# Patient Record
Sex: MALE | Race: BLACK OR AFRICAN AMERICAN | NOT HISPANIC OR LATINO | Employment: OTHER | URBAN - METROPOLITAN AREA
[De-identification: names, ages, dates, MRNs, and addresses within clinical notes are randomized per-mention and may not be internally consistent; named-entity substitution may affect disease eponyms.]

---

## 2022-10-24 ENCOUNTER — OFFICE VISIT (OUTPATIENT)
Dept: NEPHROLOGY | Facility: CLINIC | Age: 72
End: 2022-10-24
Payer: OTHER GOVERNMENT

## 2022-10-24 VITALS
DIASTOLIC BLOOD PRESSURE: 72 MMHG | WEIGHT: 272 LBS | BODY MASS INDEX: 33.12 KG/M2 | SYSTOLIC BLOOD PRESSURE: 138 MMHG | HEIGHT: 76 IN | OXYGEN SATURATION: 97 % | HEART RATE: 76 BPM

## 2022-10-24 DIAGNOSIS — N18.32 STAGE 3B CHRONIC KIDNEY DISEASE: Primary | ICD-10-CM

## 2022-10-24 PROCEDURE — 99213 PR OFFICE/OUTPT VISIT, EST, LEVL III, 20-29 MIN: ICD-10-PCS | Mod: S$GLB,,, | Performed by: INTERNAL MEDICINE

## 2022-10-24 PROCEDURE — 99213 OFFICE O/P EST LOW 20 MIN: CPT | Mod: S$GLB,,, | Performed by: INTERNAL MEDICINE

## 2022-10-24 NOTE — PROGRESS NOTES
Pt Name:  Jhonathan Carrillo  Pt :  1950  Pt MRN:  [id number]    Date: 10/24/2022  Provider: Abigail Ballesteros    Reason for visit: seeing for ckd.      Chief Complaint:   Chief Complaint   Patient presents with    Chronic Kidney Disease     Stage 3 cr 2.17 GFR 33       HPI:  [unfilled]  No complaints.    History:   Past Medical History:   Diagnosis Date    Arthritis     CAD (coronary artery disease)     CHF (congestive heart failure)     CKD (chronic kidney disease)     HTN (hypertension)     Proteinuria     Stroke      Past Surgical History:   Procedure Laterality Date    APPENDECTOMY      BACK SURGERY      CARDIAC CATHETERIZATION      CATARACT EXTRACTION Bilateral     ROTATOR CUFF REPAIR Right      Family History   Problem Relation Age of Onset    Hypertension Mother     Heart attack Mother     Hypertension Father     Heart attack Father     Seizures Sister     Hypertension Sister     Hypertension Brother      Social History     Substance and Sexual Activity   Alcohol Use Not Currently     Social History     Substance and Sexual Activity   Drug Use Yes    Types: Marijuana     Social History     Substance and Sexual Activity   Sexual Activity Yes    Partners: Female     reports being sexually active and has had partner(s) who are female.  Social History     Tobacco Use   Smoking Status Never   Smokeless Tobacco Never       Allergies:  Review of patient's allergies indicates:  No Known Allergies    [unfilled]    ROS:   Constitutional:  Denies fever or chills   Eyes:  Denies change in visual acuity   HENT:  Denies nasal congestion or sore throat   Respiratory:  As in the history of the present illness.   Cardiovascular:  As in the history of the present illness.   GI:  As in the history of the present illness.    Musculoskeletal:  Denies back pain or joint pain   Integument:  Denies rash   Neurologic:  Denies headache, focal weakness or sensory changes   Endocrine:  Denies polyuria or polydipsia   Lymphatic:  Denies swollen  "glands   Psychiatric:  Denies depression or anxiety    Physical Exam:   Vitals:   Vitals:    10/24/22 1319   BP: 138/72   Pulse: 76   SpO2: 97%   Weight: 123.4 kg (272 lb)   Height: 6' 4" (1.93 m)   PainSc: 0-No pain     Body mass index is 33.11 kg/m².    Constitutional:  Well developed, well nourished, and in no acute distress   Eyes:  PERRLA, conjunctiva normal   HENT:  Atraumatic, external ears normal, nose normal.  Neck: There is no jugular venous distension or thyromegaly.   Respiratory:  No respiratory distress, normal breath sounds, no rales, no wheezing   Cardiovascular:  Normal rate, and a regular rhythm, no murmurs, no gallops, no rub, and no edema.  GI:  Normal bowel sounds.  Musculoskeletal:  No deformities.   Neurologic:  Alert & oriented x 3, CN 2-12 normal, normal motor function, and no asterixis.   Psychiatric:  Speech and behavior appropriate.    Labs/Tests:  Lab Results   Component Value Date     10/21/2022    K 4.8 10/21/2022     (H) 10/21/2022    CO2 26 10/21/2022    BUN 27 (H) 10/21/2022    CREATININE 2.17 (H) 10/21/2022    CREATININE 2.08 (H) 03/09/2022    CREATININE 2.15 (H) 12/07/2021    GLUCOSE 102 (H) 10/21/2022    GLUCOSE Negative 10/21/2022    CALCIUM 10.2 10/21/2022    PHOSPHORUS 2.8 10/21/2022    ALBUMIN 4.6 10/21/2022    EGFRNONAA 29 (L) 10/21/2022    EGFRNONAA 31 (L) 03/09/2022    EGFRNONAA 29 (L) 12/07/2021    ESTGFRAFRICA 33 (L) 10/21/2022    ESTGFRAFRICA 35 (L) 03/09/2022    ESTGFRAFRICA 34 (L) 12/07/2021     (H) 10/21/2022    HGB 13.0 10/21/2022    HGB 12.9 03/09/2022    HGB 12.5 11/19/2021    TRIG 129 03/09/2022    CHOL 150 03/09/2022    HDL 34 (L) 03/09/2022    LDLCALC 90 03/09/2022    LABVLDL 26 03/09/2022       Assessment & Plan:    Visit Diagnosis:   [unfilled]  [unfilled]    Problem List: There is no problem list on file for this patient.    Ckd 3. - stable.    Get flu shot!    Follow Up:   Follow up in about 6 months (around " 4/24/2023).

## 2023-05-08 ENCOUNTER — OFFICE VISIT (OUTPATIENT)
Dept: NEPHROLOGY | Facility: CLINIC | Age: 73
End: 2023-05-08
Payer: OTHER GOVERNMENT

## 2023-05-08 VITALS
HEIGHT: 76 IN | BODY MASS INDEX: 32.49 KG/M2 | SYSTOLIC BLOOD PRESSURE: 140 MMHG | WEIGHT: 266.81 LBS | OXYGEN SATURATION: 97 % | HEART RATE: 69 BPM | DIASTOLIC BLOOD PRESSURE: 79 MMHG

## 2023-05-08 DIAGNOSIS — I10 PRIMARY HYPERTENSION: ICD-10-CM

## 2023-05-08 DIAGNOSIS — N18.4 STAGE 4 CHRONIC KIDNEY DISEASE: Primary | ICD-10-CM

## 2023-05-08 DIAGNOSIS — N25.81 SECONDARY HYPERPARATHYROIDISM OF RENAL ORIGIN: ICD-10-CM

## 2023-05-08 PROCEDURE — 99214 OFFICE O/P EST MOD 30 MIN: CPT | Mod: S$GLB,,, | Performed by: INTERNAL MEDICINE

## 2023-05-08 PROCEDURE — 99214 PR OFFICE/OUTPT VISIT, EST, LEVL IV, 30-39 MIN: ICD-10-PCS | Mod: S$GLB,,, | Performed by: INTERNAL MEDICINE

## 2023-05-08 RX ORDER — TIZANIDINE HYDROCHLORIDE 4 MG/1
4 CAPSULE, GELATIN COATED ORAL
COMMUNITY

## 2023-05-08 RX ORDER — ALBUTEROL SULFATE 90 UG/1
AEROSOL, METERED RESPIRATORY (INHALATION)
COMMUNITY
Start: 2022-05-17

## 2023-05-08 RX ORDER — SIMVASTATIN 80 MG/1
80 TABLET, FILM COATED ORAL
COMMUNITY
Start: 2023-03-14

## 2023-05-08 RX ORDER — FERROUS SULFATE 325(65) MG
325 TABLET ORAL
COMMUNITY
Start: 2022-08-29

## 2023-05-08 RX ORDER — EZETIMIBE 10 MG/1
10 TABLET ORAL DAILY
COMMUNITY

## 2023-05-08 RX ORDER — METOPROLOL SUCCINATE 100 MG/1
50 TABLET, EXTENDED RELEASE ORAL DAILY
COMMUNITY
Start: 2023-02-06

## 2023-05-08 NOTE — PROGRESS NOTES
Pt Name:  Jhonathan Carrillo  Pt :  1950  Pt MRN:  [id number]    Date: 2023  Provider: Abigail Ballesteros    Reason for visit: seeing for ckd.      Chief Complaint:   Chief Complaint   Patient presents with    Chronic Kidney Disease     Stage-4, Cr-2.57, GFR-25       HPI:  HPI    No complaints. No gi, gu, card or pulm complaints. Admits to taking some NSAIDs when he ran out of his muscle relaxants.   Pt counceled.        History:   Past Medical History:   Diagnosis Date    Arthritis     CAD (coronary artery disease)     CHF (congestive heart failure)     CKD (chronic kidney disease)     HTN (hypertension)     Proteinuria     Stroke      Past Surgical History:   Procedure Laterality Date    APPENDECTOMY      BACK SURGERY      CARDIAC CATHETERIZATION      CATARACT EXTRACTION Bilateral     ROTATOR CUFF REPAIR Right      Family History   Problem Relation Age of Onset    Hypertension Mother     Heart attack Mother     Hypertension Father     Heart attack Father     Seizures Sister     Hypertension Sister     Hypertension Brother      Social History     Substance and Sexual Activity   Alcohol Use Not Currently     Social History     Substance and Sexual Activity   Drug Use Yes    Types: Marijuana     Social History     Substance and Sexual Activity   Sexual Activity Yes    Partners: Female     reports being sexually active and has had partner(s) who are female.  Social History     Tobacco Use   Smoking Status Never   Smokeless Tobacco Never       Allergies:  Review of patient's allergies indicates:  No Known Allergies    Current Outpatient Medications   Medication Sig Dispense Refill    acetaminophen (TYLENOL) 500 MG tablet Take 500 mg by mouth every 6 (six) hours as needed.      albuterol (PROVENTIL/VENTOLIN HFA) 90 mcg/actuation inhaler INHALE 2 INHALATIONS BY ORAL INHALATION FOUR TIMES A DAY AS NEEDED FOR BREATHING      alprostadiL (EDEX) 10 mcg injection 10 mcg by Intracavitary route 3 (three) times a week. PRN       aspirin (ECOTRIN) 81 MG EC tablet Take 81 mg by mouth once daily.      buPROPion (WELLBUTRIN) 100 MG tablet Take 100 mg by mouth 2 (two) times daily.      calcium-vitamin D3 (OS-JOSE ANGEL 500 + D3) 500 mg-5 mcg (200 unit) per tablet Take 1 tablet by mouth 2 (two) times daily with meals.      capsicum 0.075% (ZOSTRIX) 0.075 % topical cream Apply topically 3 (three) times daily.      carvediloL (COREG) 12.5 MG tablet Take 12.5 mg by mouth 2 (two) times a day.      cloNIDine 0.3 mg/24 hr td ptwk (CATAPRES) 0.3 mg/24 hr Place 1 patch onto the skin once a week.      divalproex (DEPAKOTE) 250 MG EC tablet Take 250 mg by mouth 3 (three) times daily.      ezetimibe (ZETIA) 10 mg tablet Take 10 mg by mouth once daily.      ferrous sulfate (FEOSOL) 325 mg (65 mg iron) Tab tablet 325 mg.      furosemide (LASIX) 20 MG tablet Take 20 mg by mouth 2 (two) times daily.      HYDROcodone-acetaminophen (NORCO) 7.5-325 mg per tablet Take 1 tablet by mouth every 6 (six) hours as needed.      isosorbide dinitrate (ISORDIL) 10 MG tablet Take 10 mg by mouth 3 (three) times daily.      lisinopriL (PRINIVIL,ZESTRIL) 40 MG tablet Take 40 mg by mouth 2 (two) times daily.      methocarbamoL (ROBAXIN) 500 MG Tab Take 1,000 mg by mouth 4 (four) times daily as needed.      methyl salicylate-menthol 15-10% 15-10 % Crea Apply topically as needed.      metoprolol succinate (TOPROL-XL) 100 MG 24 hr tablet 50 mg.      omeprazole (PRILOSEC) 20 MG capsule Take 20 mg by mouth 2 (two) times daily.      polyvinyl alcohol, artificial tears, (LIQUIFILM TEARS) 1.4 % ophthalmic solution 1 drop as needed.      simvastatin (ZOCOR) 80 MG tablet 80 mg.      tiZANidine 4 mg Cap Take 4 mg by mouth every evening.      traMADoL (ULTRAM) 50 mg tablet Take 50 mg by mouth every 6 (six) hours as needed.      traZODone (DESYREL) 100 MG tablet Take 100 mg by mouth every evening. prn      hydrALAZINE (APRESOLINE) 10 MG tablet Take 20 mg by mouth 3 (three) times daily.       No  "current facility-administered medications for this visit.        ROS:   Constitutional:  Denies fever or chills   Eyes:  Denies change in visual acuity   HENT:  Denies nasal congestion or sore throat   Respiratory:  As in the history of the present illness.   Cardiovascular:  As in the history of the present illness.   GI:  As in the history of the present illness.    Musculoskeletal:  Denies back pain or joint pain   Integument:  Denies rash   Neurologic:  Denies headache, focal weakness or sensory changes   Endocrine:  Denies polyuria or polydipsia   Lymphatic:  Denies swollen glands   Psychiatric:  Denies depression or anxiety    Physical Exam:   Vitals:   Vitals:    05/08/23 1334   BP: (!) 140/79   Pulse: 69   SpO2: 97%   Weight: 121 kg (266 lb 12.8 oz)   Height: 6' 4" (1.93 m)   PainSc:   6   PainLoc: Back     Body mass index is 32.48 kg/m².    Constitutional:  Well developed, well nourished, and in no acute distress   Eyes:  PERRLA, conjunctiva normal   HENT:  Atraumatic, external ears normal, nose normal.  Neck: There is no jugular venous distension or thyromegaly.   Respiratory:  No respiratory distress, normal breath sounds, no rales, no wheezing   Cardiovascular:  Normal rate, and a regular rhythm, no murmurs, no gallops, no rub, and no edema.  GI:  Normal bowel sounds.  Musculoskeletal:  No deformities.   Neurologic:  Alert & oriented x 3, CN 2-12 normal, normal motor function, and no asterixis.   Psychiatric:  Speech and behavior appropriate.    Labs/Tests:  Lab Results   Component Value Date     10/21/2022    K 4.8 10/21/2022     (H) 10/21/2022    CO2 26 10/21/2022    BUN 27 (H) 10/21/2022    CREATININE 2.17 (H) 10/21/2022    CREATININE 2.08 (H) 03/09/2022    CREATININE 2.15 (H) 12/07/2021    GLUCOSE Negative 05/05/2023    CALCIUM 10.2 10/21/2022    PHOSPHORUS 2.8 10/21/2022    ALBUMIN 4.6 10/21/2022    EGFRNONAA 29 (L) 10/21/2022    EGFRNONAA 31 (L) 03/09/2022    EGFRNONAA 29 (L) 12/07/2021 "    ESTGFRAFRICA 33 (L) 10/21/2022    ESTGFRAFRICA 35 (L) 03/09/2022    ESTGFRAFRICA 34 (L) 12/07/2021     (H) 05/05/2023    HGB 12.3 05/05/2023    HGB 13.0 10/21/2022    HGB 12.9 03/09/2022    TRIG 129 03/09/2022    CHOL 150 03/09/2022    HDL 34 (L) 03/09/2022    LDLCALC 90 03/09/2022    LABVLDL 26 03/09/2022       Assessment & Plan:    Visit Diagnosis:   1. Stage 4 chronic kidney disease  Renal Function Panel    CBC Auto Differential    Renal Function Panel    CBC Auto Differential      2. Primary hypertension        3. Secondary hyperparathyroidism of renal origin           Problem List: There is no problem list on file for this patient.    Ckd 4 - labs worse. Stay off NSAIDs and drink more water. Recheck in only one month.    Htn    Hyperparathyroidism secondary to ckd. - no change in plan.      1. Stage 4 chronic kidney disease  -     Renal Function Panel; Future; Expected date: 06/08/2023  -     CBC Auto Differential; Future; Expected date: 06/08/2023    2. Primary hypertension    3. Secondary hyperparathyroidism of renal origin             Follow Up:   Follow up in about 1 month (around 6/8/2023).

## 2023-06-08 ENCOUNTER — OFFICE VISIT (OUTPATIENT)
Dept: NEPHROLOGY | Facility: CLINIC | Age: 73
End: 2023-06-08
Payer: OTHER GOVERNMENT

## 2023-06-08 VITALS
OXYGEN SATURATION: 97 % | SYSTOLIC BLOOD PRESSURE: 135 MMHG | HEART RATE: 65 BPM | HEIGHT: 76 IN | WEIGHT: 264.63 LBS | DIASTOLIC BLOOD PRESSURE: 83 MMHG | BODY MASS INDEX: 32.22 KG/M2

## 2023-06-08 DIAGNOSIS — N18.4 STAGE 4 CHRONIC KIDNEY DISEASE: Primary | ICD-10-CM

## 2023-06-08 DIAGNOSIS — I10 PRIMARY HYPERTENSION: ICD-10-CM

## 2023-06-08 DIAGNOSIS — E87.5 HYPERKALEMIA: ICD-10-CM

## 2023-06-08 PROCEDURE — 99213 OFFICE O/P EST LOW 20 MIN: CPT | Mod: S$GLB,,, | Performed by: INTERNAL MEDICINE

## 2023-06-08 PROCEDURE — 99213 PR OFFICE/OUTPT VISIT, EST, LEVL III, 20-29 MIN: ICD-10-PCS | Mod: S$GLB,,, | Performed by: INTERNAL MEDICINE

## 2023-06-08 RX ORDER — INDOMETHACIN 50 MG/1
50 CAPSULE ORAL
COMMUNITY

## 2023-06-08 RX ORDER — CETIRIZINE HYDROCHLORIDE 10 MG/1
10 TABLET ORAL
COMMUNITY
Start: 2023-05-30

## 2023-06-08 NOTE — PROGRESS NOTES
Pt Name:  Jhoanthan Carrillo  Pt :  1950  Pt MRN:  [id number]    Date: 2023  Provider: Abigail Ballesteros    Reason for visit:   Seeing for ckd.    Chief Complaint:   Chief Complaint   Patient presents with    Chronic Kidney Disease     Cr 2.27 GFR 29 Stage 4        HPI:  HPI   No complaints. Stopped NSAIDs. No gi, gu, pulm or card complaints.    History:   Past Medical History:   Diagnosis Date    Arthritis     CAD (coronary artery disease)     CHF (congestive heart failure)     CKD (chronic kidney disease)     HTN (hypertension)     Proteinuria     Stroke      Past Surgical History:   Procedure Laterality Date    APPENDECTOMY      BACK SURGERY      CARDIAC CATHETERIZATION      CATARACT EXTRACTION Bilateral     ROTATOR CUFF REPAIR Right      Family History   Problem Relation Age of Onset    Hypertension Mother     Heart attack Mother     Hypertension Father     Heart attack Father     Seizures Sister     Hypertension Sister     Hypertension Brother      Social History     Substance and Sexual Activity   Alcohol Use Not Currently     Social History     Substance and Sexual Activity   Drug Use Yes    Types: Marijuana     Social History     Substance and Sexual Activity   Sexual Activity Yes    Partners: Female     reports being sexually active and has had partner(s) who are female.  Social History     Tobacco Use   Smoking Status Never   Smokeless Tobacco Never       Allergies:  Review of patient's allergies indicates:  No Known Allergies    Current Outpatient Medications   Medication Sig Dispense Refill    acetaminophen (TYLENOL) 500 MG tablet Take 500 mg by mouth every 6 (six) hours as needed.      albuterol (PROVENTIL/VENTOLIN HFA) 90 mcg/actuation inhaler INHALE 2 INHALATIONS BY ORAL INHALATION FOUR TIMES A DAY AS NEEDED FOR BREATHING      alprostadiL (EDEX) 10 mcg injection 10 mcg by Intracavitary route 3 (three) times a week. PRN      aspirin (ECOTRIN) 81 MG EC tablet Take 81 mg by mouth once daily.      buPROPion  (WELLBUTRIN) 100 MG tablet Take 100 mg by mouth 2 (two) times daily.      calcium-vitamin D3 (OS-JOSE ANGEL 500 + D3) 500 mg-5 mcg (200 unit) per tablet Take 1 tablet by mouth 2 (two) times daily with meals.      capsicum 0.075% (ZOSTRIX) 0.075 % topical cream Apply topically 3 (three) times daily.      carvediloL (COREG) 12.5 MG tablet Take 12.5 mg by mouth 2 (two) times a day.      cetirizine (ZYRTEC) 10 MG tablet 10 mg.      cloNIDine 0.3 mg/24 hr td ptwk (CATAPRES) 0.3 mg/24 hr Place 1 patch onto the skin once a week.      divalproex (DEPAKOTE) 250 MG EC tablet Take 250 mg by mouth 3 (three) times daily.      ezetimibe (ZETIA) 10 mg tablet Take 10 mg by mouth once daily.      ferrous sulfate (FEOSOL) 325 mg (65 mg iron) Tab tablet 325 mg. Patient states he takes this on Mondays and Fridays      furosemide (LASIX) 20 MG tablet Take 20 mg by mouth 2 (two) times daily.      indomethacin (INDOCIN) 50 MG capsule Take 50 mg by mouth 3 (three) times daily with meals.      isosorbide dinitrate (ISORDIL) 10 MG tablet Take 10 mg by mouth 3 (three) times daily.      lisinopriL (PRINIVIL,ZESTRIL) 40 MG tablet Take 40 mg by mouth 2 (two) times daily.      methyl salicylate-menthol 15-10% 15-10 % Crea Apply topically as needed.      metoprolol succinate (TOPROL-XL) 100 MG 24 hr tablet 50 mg.      omeprazole (PRILOSEC) 20 MG capsule Take 20 mg by mouth 2 (two) times daily.      polyvinyl alcohol, artificial tears, (LIQUIFILM TEARS) 1.4 % ophthalmic solution 1 drop as needed.      simvastatin (ZOCOR) 80 MG tablet 80 mg.      tiZANidine 4 mg Cap Take 4 mg by mouth as needed.      traMADoL (ULTRAM) 50 mg tablet Take 50 mg by mouth every 6 (six) hours as needed.      hydrALAZINE (APRESOLINE) 10 MG tablet Take 20 mg by mouth 3 (three) times daily.      HYDROcodone-acetaminophen (NORCO) 7.5-325 mg per tablet Take 1 tablet by mouth every 6 (six) hours as needed.      methocarbamoL (ROBAXIN) 500 MG Tab Take 1,000 mg by mouth 4 (four) times  "daily as needed.      traZODone (DESYREL) 100 MG tablet Take 100 mg by mouth every evening. prn       No current facility-administered medications for this visit.        ROS:   Constitutional:  Denies fever or chills   Eyes:  Denies change in visual acuity   HENT:  Denies nasal congestion or sore throat   Respiratory:  As in the history of the present illness.   Cardiovascular:  As in the history of the present illness.   GI:  As in the history of the present illness.    Musculoskeletal:  Denies back pain or joint pain   Integument:  Denies rash   Neurologic:  Denies headache, focal weakness or sensory changes   Endocrine:  Denies polyuria or polydipsia   Lymphatic:  Denies swollen glands   Psychiatric:  Denies depression or anxiety    Physical Exam:   Vitals:   Vitals:    06/08/23 1058   BP: 135/83   Pulse: 65   SpO2: 97%   Weight: 120 kg (264 lb 9.6 oz)   Height: 6' 4" (1.93 m)   PainSc:   4   PainLoc: Back     Body mass index is 32.21 kg/m².    Constitutional:  Well developed, well nourished, and in no acute distress   Eyes:  PERRLA, conjunctiva normal   HENT:  Atraumatic, external ears normal, nose normal.  Neck: There is no jugular venous distension or thyromegaly.   Respiratory:  No respiratory distress, normal breath sounds, no rales, no wheezing   Cardiovascular:  Normal rate, and a regular rhythm, no murmurs, no gallops, no rub, and no edema.  GI:  Normal bowel sounds.  Musculoskeletal:  No deformities.   Neurologic:  Alert & oriented x 3, CN 2-12 normal, normal motor function, and no asterixis.   Psychiatric:  Speech and behavior appropriate.    Labs/Tests:  Lab Results   Component Value Date     10/21/2022    K 4.8 10/21/2022     (H) 10/21/2022    CO2 26 10/21/2022    BUN 27 (H) 10/21/2022    CREATININE 2.17 (H) 10/21/2022    CREATININE 2.08 (H) 03/09/2022    CREATININE 2.15 (H) 12/07/2021    GLUCOSE Negative 05/05/2023    CALCIUM 10.2 10/21/2022    PHOSPHORUS 2.8 10/21/2022    ALBUMIN 4.6 " 10/21/2022    EGFRNONAA 29 (L) 10/21/2022    EGFRNONAA 31 (L) 03/09/2022    EGFRNONAA 29 (L) 12/07/2021    ESTGFRAFRICA 33 (L) 10/21/2022    ESTGFRAFRICA 35 (L) 03/09/2022    ESTGFRAFRICA 34 (L) 12/07/2021     (H) 05/05/2023    HGB 12.7 06/06/2023    HGB 12.3 05/05/2023    HGB 13.0 10/21/2022    TRIG 129 03/09/2022    CHOL 150 03/09/2022    HDL 34 (L) 03/09/2022    LDLCALC 90 03/09/2022    LABVLDL 26 03/09/2022       Assessment & Plan:    Visit Diagnosis:   1. Stage 4 chronic kidney disease  Renal Function Panel    PTH, Intact    Renal Function Panel    PTH, Intact      2. Primary hypertension        3. Hyperkalemia           Problem List: There is no problem list on file for this patient.  =  Ckd4 - cr is better off NSAID    Hyperkalemia - low K+ diet. (DC cranberry juice)    Htn good.    1. Stage 4 chronic kidney disease  -     Renal Function Panel; Future; Expected date: 09/08/2023  -     PTH, Intact; Future; Expected date: 09/08/2023    2. Primary hypertension    3. Hyperkalemia             Follow Up:   Follow up in about 3 months (around 9/8/2023).

## 2023-09-14 ENCOUNTER — OFFICE VISIT (OUTPATIENT)
Dept: NEPHROLOGY | Facility: CLINIC | Age: 73
End: 2023-09-14
Payer: OTHER GOVERNMENT

## 2023-09-14 VITALS
HEART RATE: 68 BPM | HEIGHT: 76 IN | WEIGHT: 269 LBS | BODY MASS INDEX: 32.76 KG/M2 | DIASTOLIC BLOOD PRESSURE: 87 MMHG | SYSTOLIC BLOOD PRESSURE: 156 MMHG | OXYGEN SATURATION: 97 %

## 2023-09-14 DIAGNOSIS — I10 PRIMARY HYPERTENSION: ICD-10-CM

## 2023-09-14 DIAGNOSIS — N25.81 SECONDARY HYPERPARATHYROIDISM OF RENAL ORIGIN: ICD-10-CM

## 2023-09-14 DIAGNOSIS — N18.4 STAGE 4 CHRONIC KIDNEY DISEASE: Primary | ICD-10-CM

## 2023-09-14 PROCEDURE — 99213 PR OFFICE/OUTPT VISIT, EST, LEVL III, 20-29 MIN: ICD-10-PCS | Mod: S$GLB,,, | Performed by: INTERNAL MEDICINE

## 2023-09-14 PROCEDURE — 99213 OFFICE O/P EST LOW 20 MIN: CPT | Mod: S$GLB,,, | Performed by: INTERNAL MEDICINE

## 2023-09-14 NOTE — PROGRESS NOTES
Pt Name:  Jhonathan Carrillo  Pt :  1950  Pt MRN:  [id number]    Date: 2023  Provider: Abigail Ballesteros    Reason for visit:   Seeing for ckd.    Chief Complaint:   Chief Complaint   Patient presents with    Chronic Kidney Disease     Serum creatinine 2.87, GFR 22, CKD stage 4       HPI:  HPI   No complaints. No gi, gu, card or pulm symptoms.    History:   Past Medical History:   Diagnosis Date    Arthritis     CAD (coronary artery disease)     CHF (congestive heart failure)     CKD (chronic kidney disease)     HTN (hypertension)     Proteinuria     Stroke      Past Surgical History:   Procedure Laterality Date    APPENDECTOMY      BACK SURGERY      CARDIAC CATHETERIZATION      CATARACT EXTRACTION Bilateral     ROTATOR CUFF REPAIR Right      Family History   Problem Relation Age of Onset    Hypertension Mother     Heart attack Mother     Hypertension Father     Heart attack Father     Seizures Sister     Hypertension Sister     Hypertension Brother      Social History     Substance and Sexual Activity   Alcohol Use Not Currently     Social History     Substance and Sexual Activity   Drug Use Yes    Types: Marijuana, Hydrocodone     Social History     Substance and Sexual Activity   Sexual Activity Yes    Partners: Female     reports being sexually active and has had partner(s) who are female.  Social History     Tobacco Use   Smoking Status Never   Smokeless Tobacco Never       Allergies:  Review of patient's allergies indicates:  No Known Allergies    Current Outpatient Medications   Medication Sig Dispense Refill    acetaminophen (TYLENOL) 500 MG tablet Take 500 mg by mouth every 6 (six) hours as needed.      albuterol (PROVENTIL/VENTOLIN HFA) 90 mcg/actuation inhaler INHALE 2 INHALATIONS BY ORAL INHALATION FOUR TIMES A DAY AS NEEDED FOR BREATHING      alprostadiL (EDEX) 10 mcg injection 10 mcg by Intracavitary route 3 (three) times a week. PRN      aspirin (ECOTRIN) 81 MG EC tablet Take 81 mg by mouth once daily.       buPROPion (WELLBUTRIN) 100 MG tablet Take 100 mg by mouth 2 (two) times daily.      calcium-vitamin D3 (OS-JOSE ANGEL 500 + D3) 500 mg-5 mcg (200 unit) per tablet Take 1 tablet by mouth 2 (two) times daily with meals.      carvediloL (COREG) 12.5 MG tablet Take 12.5 mg by mouth 2 (two) times a day.      cetirizine (ZYRTEC) 10 MG tablet 10 mg.      cloNIDine 0.3 mg/24 hr td ptwk (CATAPRES) 0.3 mg/24 hr Place 1 patch onto the skin once a week.      divalproex (DEPAKOTE) 250 MG EC tablet Take 250 mg by mouth 3 (three) times daily.      ezetimibe (ZETIA) 10 mg tablet Take 10 mg by mouth once daily.      ferrous sulfate (FEOSOL) 325 mg (65 mg iron) Tab tablet 325 mg. Patient states he takes this on Mondays and Fridays      furosemide (LASIX) 20 MG tablet Take 20 mg by mouth 2 (two) times daily.      HYDROcodone-acetaminophen (NORCO) 7.5-325 mg per tablet Take 1 tablet by mouth every 6 (six) hours as needed.      indomethacin (INDOCIN) 50 MG capsule Take 50 mg by mouth 3 (three) times daily with meals.      isosorbide dinitrate (ISORDIL) 10 MG tablet Take 10 mg by mouth 3 (three) times daily.      lisinopriL (PRINIVIL,ZESTRIL) 40 MG tablet Take 40 mg by mouth 2 (two) times daily.      methocarbamoL (ROBAXIN) 500 MG Tab Take 1,000 mg by mouth 4 (four) times daily as needed.      metoprolol succinate (TOPROL-XL) 100 MG 24 hr tablet 50 mg once daily.      omeprazole (PRILOSEC) 20 MG capsule Take 20 mg by mouth 2 (two) times daily.      polyvinyl alcohol, artificial tears, (LIQUIFILM TEARS) 1.4 % ophthalmic solution 1 drop as needed.      simvastatin (ZOCOR) 80 MG tablet 80 mg.      tiZANidine 4 mg Cap Take 4 mg by mouth as needed.      traMADoL (ULTRAM) 50 mg tablet Take 50 mg by mouth every 6 (six) hours as needed.      traZODone (DESYREL) 100 MG tablet Take 100 mg by mouth every evening. prn      capsicum 0.075% (ZOSTRIX) 0.075 % topical cream Apply topically 3 (three) times daily.      hydrALAZINE (APRESOLINE) 10 MG tablet  "Take 20 mg by mouth 3 (three) times daily.      methyl salicylate-menthol 15-10% 15-10 % Crea Apply topically as needed.       No current facility-administered medications for this visit.        ROS:   Constitutional:  Denies fever or chills   Eyes:  Denies change in visual acuity   HENT:  Denies nasal congestion or sore throat   Respiratory:  As in the history of the present illness.   Cardiovascular:  As in the history of the present illness.   GI:  As in the history of the present illness.    Musculoskeletal:  Denies back pain or joint pain   Integument:  Denies rash   Neurologic:  Denies headache, focal weakness or sensory changes   Endocrine:  Denies polyuria or polydipsia   Lymphatic:  Denies swollen glands   Psychiatric:  Denies depression or anxiety    Physical Exam:   Vitals:   Vitals:    09/14/23 1023   BP: (!) 156/87   Pulse: 68   SpO2: 97%   Weight: 122 kg (269 lb)   Height: 6' 4" (1.93 m)     Body mass index is 32.74 kg/m².    Constitutional:  Well developed, well nourished, and in no acute distress   Eyes:  PERRLA, conjunctiva normal   HENT:  Atraumatic, external ears normal, nose normal.  Neck: There is no jugular venous distension or thyromegaly.   Respiratory:  No respiratory distress, normal breath sounds, no rales, no wheezing   Cardiovascular:  Normal rate, and a regular rhythm, no murmurs, no gallops, no rub, and no edema.  GI:  Normal bowel sounds.  Musculoskeletal:  No deformities.   Neurologic:  Alert & oriented x 3, CN 2-12 normal, normal motor function, and no asterixis.   Psychiatric:  Speech and behavior appropriate.    Labs/Tests:  Lab Results   Component Value Date     10/21/2022    K 4.8 10/21/2022     (H) 10/21/2022    CO2 26 10/21/2022    BUN 27 (H) 10/21/2022    CREATININE 2.17 (H) 10/21/2022    CREATININE 2.08 (H) 03/09/2022    CREATININE 2.15 (H) 12/07/2021    GLUCOSE Negative 05/05/2023    CALCIUM 10.2 10/21/2022    PHOSPHORUS 2.8 10/21/2022    ALBUMIN 4.6 10/21/2022 "    EGFRNONAA 29 (L) 10/21/2022    EGFRNONAA 31 (L) 03/09/2022    EGFRNONAA 29 (L) 12/07/2021    ESTGFRAFRICA 33 (L) 10/21/2022    ESTGFRAFRICA 35 (L) 03/09/2022    ESTGFRAFRICA 34 (L) 12/07/2021     (H) 05/05/2023    HGB 12.7 06/06/2023    HGB 12.3 05/05/2023    HGB 13.0 10/21/2022    TRIG 129 03/09/2022    CHOL 150 03/09/2022    HDL 34 (L) 03/09/2022    LDLCALC 90 03/09/2022    LABVLDL 26 03/09/2022       Assessment & Plan:    Visit Diagnosis:   1. Stage 4 chronic kidney disease  Urinalysis    Renal Function Panel    US Kidney    Urinalysis    Renal Function Panel    US Kidney      2. Primary hypertension        3. Secondary hyperparathyroidism of renal origin           Problem List: There is no problem list on file for this patient.  =  Ckd 4 - gfr lower.US and drink water and rtc in one month.    htn    1. Stage 4 chronic kidney disease  -     Urinalysis; Future; Expected date: 10/14/2023  -     Renal Function Panel; Future; Expected date: 10/14/2023  -     US Kidney; Future; Expected date: 09/21/2023    2. Primary hypertension    3. Secondary hyperparathyroidism of renal origin             Follow Up:   Follow up in about 1 month (around 10/14/2023).

## 2023-10-17 ENCOUNTER — TELEPHONE (OUTPATIENT)
Dept: NEPHROLOGY | Facility: CLINIC | Age: 73
End: 2023-10-17
Payer: OTHER GOVERNMENT

## 2023-10-19 ENCOUNTER — OFFICE VISIT (OUTPATIENT)
Dept: NEPHROLOGY | Facility: CLINIC | Age: 73
End: 2023-10-19
Payer: OTHER GOVERNMENT

## 2023-10-19 VITALS
OXYGEN SATURATION: 98 % | SYSTOLIC BLOOD PRESSURE: 148 MMHG | WEIGHT: 271 LBS | HEART RATE: 62 BPM | BODY MASS INDEX: 33 KG/M2 | HEIGHT: 76 IN | DIASTOLIC BLOOD PRESSURE: 89 MMHG

## 2023-10-19 DIAGNOSIS — I10 PRIMARY HYPERTENSION: ICD-10-CM

## 2023-10-19 DIAGNOSIS — N18.4 STAGE 4 CHRONIC KIDNEY DISEASE: Primary | ICD-10-CM

## 2023-10-19 PROCEDURE — 99214 OFFICE O/P EST MOD 30 MIN: CPT | Mod: S$GLB,,, | Performed by: INTERNAL MEDICINE

## 2023-10-19 PROCEDURE — 99214 PR OFFICE/OUTPT VISIT, EST, LEVL IV, 30-39 MIN: ICD-10-PCS | Mod: S$GLB,,, | Performed by: INTERNAL MEDICINE

## 2023-10-19 NOTE — PROGRESS NOTES
Pt Name:  Jhonathan Carrillo  Pt :  1950  Pt MRN:  [id number]    Date: 10/19/2023  Provider: Abigail Ballesteros    Reason for visit: seeing for ckd.      Chief Complaint:   Chief Complaint   Patient presents with    Chronic Kidney Disease     Cre:2.55 Gfr: 25 CKD stage: 4       HPI:  HPI   No complaints. No gi, gu, card, or pulm complaints.    History:   Past Medical History:   Diagnosis Date    Arthritis     CAD (coronary artery disease)     CHF (congestive heart failure)     CKD (chronic kidney disease)     HTN (hypertension)     Proteinuria     Stroke      Past Surgical History:   Procedure Laterality Date    APPENDECTOMY      BACK SURGERY      CARDIAC CATHETERIZATION      CATARACT EXTRACTION Bilateral     ROTATOR CUFF REPAIR Right      Family History   Problem Relation Age of Onset    Hypertension Mother     Heart attack Mother     Hypertension Father     Heart attack Father     Seizures Sister     Hypertension Sister     Hypertension Brother      Social History     Substance and Sexual Activity   Alcohol Use Not Currently     Social History     Substance and Sexual Activity   Drug Use Yes    Types: Marijuana, Hydrocodone     Social History     Substance and Sexual Activity   Sexual Activity Yes    Partners: Female     reports being sexually active and has had partner(s) who are female.  Social History     Tobacco Use   Smoking Status Never   Smokeless Tobacco Never       Allergies:  Review of patient's allergies indicates:  No Known Allergies    Current Outpatient Medications   Medication Sig Dispense Refill    acetaminophen (TYLENOL) 500 MG tablet Take 500 mg by mouth every 6 (six) hours as needed.      albuterol (PROVENTIL/VENTOLIN HFA) 90 mcg/actuation inhaler INHALE 2 INHALATIONS BY ORAL INHALATION FOUR TIMES A DAY AS NEEDED FOR BREATHING      alprostadiL (EDEX) 10 mcg injection 10 mcg by Intracavitary route 3 (three) times a week. PRN      aspirin (ECOTRIN) 81 MG EC tablet Take 81 mg by mouth once daily.       buPROPion (WELLBUTRIN) 100 MG tablet Take 100 mg by mouth 2 (two) times daily.      calcium-vitamin D3 (OS-JOSE ANGEL 500 + D3) 500 mg-5 mcg (200 unit) per tablet Take 1 tablet by mouth 2 (two) times daily with meals.      capsicum 0.075% (ZOSTRIX) 0.075 % topical cream Apply topically 3 (three) times daily.      carvediloL (COREG) 12.5 MG tablet Take 12.5 mg by mouth 2 (two) times a day.      cetirizine (ZYRTEC) 10 MG tablet 10 mg.      cloNIDine 0.3 mg/24 hr td ptwk (CATAPRES) 0.3 mg/24 hr Place 1 patch onto the skin once a week.      divalproex (DEPAKOTE) 250 MG EC tablet Take 250 mg by mouth 3 (three) times daily.      ezetimibe (ZETIA) 10 mg tablet Take 10 mg by mouth once daily.      ferrous sulfate (FEOSOL) 325 mg (65 mg iron) Tab tablet 325 mg. Patient states he takes this on Mondays and Fridays      furosemide (LASIX) 20 MG tablet Take 20 mg by mouth 2 (two) times daily.      HYDROcodone-acetaminophen (NORCO) 7.5-325 mg per tablet Take 1 tablet by mouth every 6 (six) hours as needed.      indomethacin (INDOCIN) 50 MG capsule Take 50 mg by mouth 3 (three) times daily with meals.      isosorbide dinitrate (ISORDIL) 10 MG tablet Take 10 mg by mouth 3 (three) times daily.      lisinopriL (PRINIVIL,ZESTRIL) 40 MG tablet Take 40 mg by mouth 2 (two) times daily.      methocarbamoL (ROBAXIN) 500 MG Tab Take 1,000 mg by mouth 4 (four) times daily as needed.      methyl salicylate-menthol 15-10% 15-10 % Crea Apply topically as needed.      metoprolol succinate (TOPROL-XL) 100 MG 24 hr tablet 50 mg once daily.      omeprazole (PRILOSEC) 20 MG capsule Take 20 mg by mouth 2 (two) times daily.      polyvinyl alcohol, artificial tears, (LIQUIFILM TEARS) 1.4 % ophthalmic solution 1 drop as needed.      simvastatin (ZOCOR) 80 MG tablet 80 mg.      tiZANidine 4 mg Cap Take 4 mg by mouth as needed.      traMADoL (ULTRAM) 50 mg tablet Take 50 mg by mouth every 6 (six) hours as needed.      traZODone (DESYREL) 100 MG tablet Take 100  "mg by mouth every evening. prn       No current facility-administered medications for this visit.        ROS:   Constitutional:  Denies fever or chills   Eyes:  Denies change in visual acuity   HENT:  Denies nasal congestion or sore throat   Respiratory:  As in the history of the present illness.   Cardiovascular:  As in the history of the present illness.   GI:  As in the history of the present illness.    Musculoskeletal:  Denies back pain or joint pain   Integument:  Denies rash   Neurologic:  Denies headache, focal weakness or sensory changes   Endocrine:  Denies polyuria or polydipsia   Lymphatic:  Denies swollen glands   Psychiatric:  Denies depression or anxiety    Physical Exam:   Vitals:   Vitals:    10/19/23 1106   BP: (!) 148/89   Pulse: 62   SpO2: 98%   Weight: 122.9 kg (271 lb)   Height: 6' 4" (1.93 m)     Body mass index is 32.99 kg/m².    Constitutional:  Well developed, well nourished, and in no acute distress   Eyes:  PERRLA, conjunctiva normal   HENT:  Atraumatic, external ears normal, nose normal.  Neck: There is no jugular venous distension or thyromegaly.   Respiratory:  No respiratory distress, normal breath sounds, no rales, no wheezing   Cardiovascular:  Normal rate, and a regular rhythm, no murmurs, no gallops, no rub, and no edema.  GI:  Normal bowel sounds.  Musculoskeletal:  No deformities.   Neurologic:  Alert & oriented x 3, CN 2-12 normal, normal motor function, and no asterixis.   Psychiatric:  Speech and behavior appropriate.    Labs/Tests:  Lab Results   Component Value Date     10/21/2022    K 4.8 10/21/2022     (H) 10/21/2022    CO2 26 10/21/2022    BUN 27 (H) 10/21/2022    CREATININE 2.17 (H) 10/21/2022    CREATININE 2.08 (H) 03/09/2022    CREATININE 2.15 (H) 12/07/2021    GLUCOSE Negative 10/18/2023    CALCIUM 10.2 10/21/2022    PHOSPHORUS 2.8 10/21/2022    ALBUMIN 4.6 10/21/2022    EGFRNONAA 29 (L) 10/21/2022    EGFRNONAA 31 (L) 03/09/2022    EGFRNONAA 29 (L) " 12/07/2021    ESTGFRAFRICA 33 (L) 10/21/2022    ESTGFRAFRICA 35 (L) 03/09/2022    ESTGFRAFRICA 34 (L) 12/07/2021     (H) 10/18/2023    HGB 12.7 06/06/2023    HGB 12.3 05/05/2023    HGB 13.0 10/21/2022    TRIG 129 03/09/2022    CHOL 150 03/09/2022    HDL 34 (L) 03/09/2022    LDLCALC 90 03/09/2022    LABVLDL 26 03/09/2022       Assessment & Plan:    Visit Diagnosis:   1. Stage 4 chronic kidney disease  Renal Function Panel    PTH, Intact    Urinalysis    CBC Auto Differential    Renal Function Panel    PTH, Intact    Urinalysis    CBC Auto Differential      2. Primary hypertension           Problem List: There is no problem list on file for this patient.  -  Ckd 4 - gfr back up to baseline. States seldom takes indocin. Counceled.    htn    1. Stage 4 chronic kidney disease  -     Renal Function Panel; Future; Expected date: 01/19/2024  -     PTH, Intact; Future; Expected date: 01/19/2024  -     Urinalysis; Future; Expected date: 01/19/2024  -     CBC Auto Differential; Future; Expected date: 01/19/2024    2. Primary hypertension             Follow Up:   Follow up in about 3 months (around 1/19/2024).